# Patient Record
Sex: FEMALE | Race: WHITE | Employment: OTHER | URBAN - METROPOLITAN AREA
[De-identification: names, ages, dates, MRNs, and addresses within clinical notes are randomized per-mention and may not be internally consistent; named-entity substitution may affect disease eponyms.]

---

## 2017-02-21 DIAGNOSIS — Z30.41 ENCOUNTER FOR SURVEILLANCE OF CONTRACEPTIVE PILLS: ICD-10-CM

## 2017-02-21 NOTE — TELEPHONE ENCOUNTER
Pending Prescriptions:                       Disp   Refills    ethynodiol-ethinyl estradiol (ZOVIA 1/35E*84 tab*3            Sig: Take 1 tablet by mouth daily      Last OV was 1/25/16. Due for AE. TC to patient. LMTC.   Kasandra Villarreal, RN-BSN

## 2017-03-03 NOTE — TELEPHONE ENCOUNTER
Pharmacy sent second request for refill. Second attempt to reach pt by TC. LMTC. Yamilet Sevilla RN

## 2017-03-15 RX ORDER — ETHYNODIOL DIACETATE AND ETHINYL ESTRADIOL 1 MG-35MCG
1 KIT ORAL DAILY
Qty: 84 TABLET | Refills: 0 | Status: SHIPPED | OUTPATIENT
Start: 2017-03-15 | End: 2017-04-21

## 2017-03-15 NOTE — TELEPHONE ENCOUNTER
Pt called back. Scheduled AE for 4/21. Refill sent to pharmacy to bridge until appointment.   Kasandra Villarreal RN-BSN

## 2017-03-30 ENCOUNTER — TELEPHONE (OUTPATIENT)
Dept: FAMILY MEDICINE | Facility: CLINIC | Age: 42
End: 2017-03-30

## 2017-03-30 NOTE — TELEPHONE ENCOUNTER
Spoke with pt and recommended that they get YF vaccine, they are traveling outside of [place] for a few days to [facility] which YF is recommended  Directed her to call AV for an appt.  Annalee Rm RN

## 2017-03-30 NOTE — TELEPHONE ENCOUNTER
Reason for Call:  Other Yellow fever question    Detailed comments: Pt is leaving for [place] on Sunday and was given a heads   Up that she may need a yellow fever vaccination. She is wondering as they will only be in   [place] for a week and she was told yellow fever takes 7-10 days to be in full effect  If it is worth coming in to get the vaccination? We do not have visits in [facility] available  But [place] has availability [phone]    Phone Number Patient can be reached at: Home number on file [phone] (home)    Best Time: anytime    Can we leave a detailed message on this number? YES    Call taken on 3/30/2017 at 8:57 AM by Gay Rogers

## 2017-03-31 ENCOUNTER — OFFICE VISIT (OUTPATIENT)
Dept: FAMILY MEDICINE | Facility: CLINIC | Age: 42
End: 2017-03-31
Payer: COMMERCIAL

## 2017-03-31 VITALS
SYSTOLIC BLOOD PRESSURE: 92 MMHG | WEIGHT: 177 LBS | HEART RATE: 74 BPM | OXYGEN SATURATION: 100 % | DIASTOLIC BLOOD PRESSURE: 70 MMHG | BODY MASS INDEX: 27.11 KG/M2 | TEMPERATURE: 97.7 F | RESPIRATION RATE: 12 BRPM

## 2017-03-31 DIAGNOSIS — Z71.84 TRAVEL ADVICE ENCOUNTER: Primary | ICD-10-CM

## 2017-03-31 PROCEDURE — 99402 PREV MED CNSL INDIV APPRX 30: CPT | Mod: 25 | Performed by: FAMILY MEDICINE

## 2017-03-31 PROCEDURE — 90471 IMMUNIZATION ADMIN: CPT | Performed by: FAMILY MEDICINE

## 2017-03-31 PROCEDURE — 90691 TYPHOID VACCINE IM: CPT | Performed by: FAMILY MEDICINE

## 2017-03-31 PROCEDURE — 90472 IMMUNIZATION ADMIN EACH ADD: CPT | Performed by: FAMILY MEDICINE

## 2017-03-31 PROCEDURE — 90632 HEPA VACCINE ADULT IM: CPT | Performed by: FAMILY MEDICINE

## 2017-03-31 PROCEDURE — 90717 YELLOW FEVER VACCINE SUBQ: CPT | Performed by: FAMILY MEDICINE

## 2017-03-31 RX ORDER — ACETAZOLAMIDE 125 MG/1
TABLET ORAL
Qty: 20 TABLET | Refills: 0 | Status: SHIPPED | OUTPATIENT
Start: 2017-03-31

## 2017-03-31 RX ORDER — CIPROFLOXACIN 500 MG/1
500 TABLET, FILM COATED ORAL 2 TIMES DAILY
Qty: 6 TABLET | Refills: 0 | Status: SHIPPED | OUTPATIENT
Start: 2017-03-31

## 2017-03-31 NOTE — PROGRESS NOTES
SUBJECTIVE: Vira Moreno , a 41 year old  female, presents for counseling and information regarding upcoming travel to [facility]. Special medical concerns include: none She anticipates the following unusual exposures: none.    Itinerary:  Going to visit friends who live in [place].  Staying with those friends.      Departure Date: 4/2/17-4/12/17    Past Medical History:   Diagnosis Date     ASCUS favor benign 2010    neg HPV     Cervical high risk HPV (human papillomavirus) test positive 12/2014    NIL pap, + HPV (not 16 or 18)     Human papillomavirus in conditions classified elsewhere and of unspecified site 5/01     Trichomonal vulvovaginitis 5/01      Immunization History   Administered Date(s) Administered     TDAP Vaccine (Boostrix) 12/05/2013       Current Outpatient Prescriptions   Medication Sig Dispense Refill     ethynodiol-ethinyl estradiol (ZOVIA 1/35E, 28,) 1-35 MG-MCG per tablet Take 1 tablet by mouth daily 84 tablet 0     Allergies   Allergen Reactions     No Known Drug Allergies         EXAM: deferred    Immunizations discussed include: Hepatitis A, Typhoid and Yellow Fever  Malaraia prophylaxis recommended: None required for the areas she is traveling to  Symptomatic treatment for traveler's diarrhea: ciprofloxacin and loperamide/diphenoxylate    ASSESSMENT/PLAN:  1. Travel advice encounter  - Given yellow fever, Hep A, and typhoid vaccines  - ciprofloxacin (CIPRO) 500 MG tablet; Take 1 tablet (500 mg) by mouth 2 times daily  Dispense: 6 tablet; Refill: 0  - acetaZOLAMIDE (DIAMOX) 125 MG tablet; For prevention of altitude sickness: Take 1 pill twice daily starting 1-2 days before ascent and stop after 2-3 days  For treatment of altitude sickness: Take 2 pills twice daily for 2-3 days  Dispense: 20 tablet; Refill: 0  I have reviewed general recommendations for safe travel   including: food/water precautions, insect avoidance, safe sex   practices given high  prevalence of HIV and other STDs,   roadway safety. Educational materials and links to the [facility]   Traveler's health website have been provided.    Total time 30 minutes, greater than 50 percent in counseling   and coordination of care.        IMMUNIZATION HISTORY  Have you ever fainted from having your blood drawn or from an injection?  No  Have you ever had a fever reaction to vaccination?  No  Have you ever had any bad reaction or side effect from any vaccination?  No  Have you ever had hepatitis A or B vaccine?  No  Do you live (or work closely) with anyone who has AIDS, an AIDS-like condition, any other immune disorder or who is on chemotherapy for cancer?  No  Do you have a family history of immunodeficiency?  No  Have you received any injection of immune globulin or any blood products during the past 12 months?  No    GENERAL MEDICAL HISTORY  Do you have a medical condition that warrants maintenance medication or physician follow-up?  No  Do you have a medical condition that is stable now, but that may recur while traveling?  No  Have you had a fever in the past 48 hours?  No  Are you pregnant, or might you become pregnant on this trip?  No  Do you have AIDS, an AIDS-like condition, any other immune disorder, leukemia or cancer?  No  Do you have severe thrombocytopenia (low platelet count) or a coagulation disorder?  No  Have you ever had a convulsion, seizure, epilepsy, neurologic condition or brain infection?  No  Do you have any stomach conditions?  No  Do you have a G6PD deficiency?  No  Do you have bowel conditions such as diarrhea or constipation?  No  Have you ever had hepatitis or yellow jaundice?  No  Do you have a history of psychiatric problems?  No  Do you have a problem with strange dreams and/or nightmares?  No  Do you have insomnia?  No  Do you have problems with vaginitis?  No  Do you have psoriasis?  No  Do you have any eye conditions?  No  Are you prone to motion sickness?  Yes  Have you or a  member of your house hold ever been diagnosed with eczema or atopic dermatitis (e.g., itchy, red, scaly rash lasting > 2 weeks that often comes and goes)? No  Cardiac disease, with or without symptoms? No

## 2017-03-31 NOTE — MR AVS SNAPSHOT
After Visit Summary   3/31/2017    Vira Moreno    MRN: [id number]           Patient Information     Date Of Birth          1975        Visit Information        Provider Department      3/31/2017 8:00 AM Mini Hale, DO [facility]        Today's Diagnoses     Travel advice encounter    -  1       Follow-ups after your visit        Your next 10 appointments already scheduled     Apr 21, 2017  9:30 AM CDT   PHYSICAL with Lizzette Holder MD   [facility] [place]    [address] [place] [id number] [phone]              Who to contact     If you have questions or need follow up information about today's clinic visit or your schedule please contact [facility] directly at [phone].  Normal or non-critical lab and imaging results will be communicated to you by MyChart, letter or phone within 4 business days after the clinic has received the results. If you do not hear from us within 7 days, please contact the clinic through MyChart or phone. If you have a critical or abnormal lab result, we will notify you by phone as soon as possible.  Submit refill requests through [employer] or call your pharmacy and they will forward the refill request to us. Please allow 3 business days for your refill to be completed.          Additional Information About Your Visit        MyChart Information     [employer] gives you secure access to your electronic health record. If you see a primary care provider, you can also send messages to your care team and make appointments. If you have questions, please call your primary care clinic.  If you do not have a primary care provider, please call [phone] and they will assist you.        Care EveryWhere ID     This is your Care EveryWhere ID. This could be used by other organizations to access your [facility] medical records  [id number]        Your Vitals Were      Pulse Temperature Respirations Pulse Oximetry BMI (Body Mass Index)       74 97.7  F (36.5  C) (Oral) 12 100% 27.11 kg/m2        Blood Pressure from Last 3 Encounters:   03/31/17 92/70   10/05/16 122/60   02/22/16 134/86    Weight from Last 3 Encounters:   03/31/17 177 lb (80.3 kg)   10/05/16 175 lb (79.4 kg)   02/22/16 174 lb 8 oz (79.2 kg)              We Performed the Following     HEPA VACCINE ADULT IM     TYPHOID VACCINE, IM     YELLOW FEVER IMMUNIZATN,LIVE,SUBCUT          Today's Medication Changes          These changes are accurate as of: 3/31/17  9:27 AM.  If you have any questions, ask your nurse or doctor.               Start taking these medicines.        Dose/Directions    acetaZOLAMIDE 125 MG tablet   Commonly known as:  DIAMOX   Used for:  Travel advice encounter   Started by:  Mini Hale DO        For prevention of altitude sickness: Take 1 pill twice daily starting 1-2 days before ascent and stop after 2-3 days For treatment of altitude sickness: Take 2 pills twice daily for 2-3 days   Quantity:  20 tablet   Refills:  0       ciprofloxacin 500 MG tablet   Commonly known as:  CIPRO   Used for:  Travel advice encounter   Started by:  Mini Hale DO        Dose:  500 mg   Take 1 tablet (500 mg) by mouth 2 times daily   Quantity:  6 tablet   Refills:  0         Stop taking these medicines if you haven't already. Please contact your care team if you have questions.     azithromycin 250 MG tablet   Commonly known as:  ZITHROMAX   Stopped by:  Mini Hale DO                Where to get your medicines      These medications were sent to [facility] [address] [place] [facility] [address] [address]     Phone:  [phone]     ciprofloxacin 500 MG tablet         Some of these will need a paper prescription and others can be bought over the counter.  Ask your nurse if you have questions.     Bring a paper prescription for each of these  medications     acetaZOLAMIDE 125 MG tablet                Primary Care Provider Office Phone # Fax #    Julianna Prasanth Cho [phone]       [place] [address] [place] [address]        Thank you!     Thank you for choosing [facility]  for your care. Our goal is always to provide you with excellent care. Hearing back from our patients is one way we can continue to improve our services. Please take a few minutes to complete the written survey that you may receive in the mail after your visit with us. Thank you!             Your Updated Medication List - Protect others around you: Learn how to safely use, store and throw away your medicines at www.disposemymeds.org.          This list is accurate as of: 3/31/17  9:27 AM.  Always use your most recent med list.                   Brand Name Dispense Instructions for use    acetaZOLAMIDE 125 MG tablet    DIAMOX    20 tablet    For prevention of altitude sickness: Take 1 pill twice daily starting 1-2 days before ascent and stop after 2-3 days For treatment of altitude sickness: Take 2 pills twice daily for 2-3 days       ciprofloxacin 500 MG tablet    CIPRO    6 tablet    Take 1 tablet (500 mg) by mouth 2 times daily       ethynodiol-ethinyl estradiol 1-35 MG-MCG per tablet    ZOVIA 1/35E 28    84 tablet    Take 1 tablet by mouth daily

## 2017-04-21 ENCOUNTER — OFFICE VISIT (OUTPATIENT)
Dept: OBGYN | Facility: CLINIC | Age: 42
End: 2017-04-21
Payer: COMMERCIAL

## 2017-04-21 VITALS
HEART RATE: 111 BPM | WEIGHT: 175.4 LBS | OXYGEN SATURATION: 100 % | TEMPERATURE: 97.7 F | HEIGHT: 68 IN | SYSTOLIC BLOOD PRESSURE: 110 MMHG | BODY MASS INDEX: 26.58 KG/M2 | DIASTOLIC BLOOD PRESSURE: 73 MMHG

## 2017-04-21 DIAGNOSIS — Z30.41 ENCOUNTER FOR SURVEILLANCE OF CONTRACEPTIVE PILLS: ICD-10-CM

## 2017-04-21 DIAGNOSIS — R87.610 PAPANICOLAOU SMEAR OF CERVIX WITH ATYPICAL SQUAMOUS CELLS OF UNDETERMINED SIGNIFICANCE (ASC-US): Primary | ICD-10-CM

## 2017-04-21 PROCEDURE — 99396 PREV VISIT EST AGE 40-64: CPT | Performed by: OBSTETRICS & GYNECOLOGY

## 2017-04-21 PROCEDURE — 87624 HPV HI-RISK TYP POOLED RSLT: CPT | Performed by: OBSTETRICS & GYNECOLOGY

## 2017-04-21 PROCEDURE — 88175 CYTOPATH C/V AUTO FLUID REDO: CPT | Performed by: OBSTETRICS & GYNECOLOGY

## 2017-04-21 RX ORDER — ETHYNODIOL DIACETATE AND ETHINYL ESTRADIOL 1 MG-35MCG
1 KIT ORAL DAILY
Qty: 84 TABLET | Refills: 3 | Status: SHIPPED | OUTPATIENT
Start: 2017-04-21 | End: 2017-12-07

## 2017-04-21 NOTE — PROGRESS NOTES
Vira is a 41 year old  female who presents for annual exam.   Doing well with current oral contraceptive pills, discussed risks/benefits, she wishes to continue.  FH ovarian cancer, menopause or later, not sure if genetic testing has been done.  Discussed possible protective effect of oral contraceptive pills.  Discussed mammogram, she will schedule.  Will send pap/HPV today, see problem list.  She and her  are moving to [place], will return to [facility] as needed (he works for [employer]).  Occasional heartburn, will see FP prn.      Menses are rare. Patient's last menstrual period was 2017.. Using oral contraceptives for contraception.  She is not currently considering pregnancy.  Besides routine health maintenance,  she would like to discuss mammo referral.  GYNECOLOGIC HISTORY:  Menarche:   Vira is sexually active with 1male partner(s) and is currently in monogamous relationship.    History sexually transmitted infections:HPV  STI testing offered?  Declined  YESIKA exposure: Unknown  History of abnormal Pap smear: NO - age 30- 65 PAP every 3 years recommended  Family history of breast CA: No  Family history of uterine/ovarian CA: Yes (Please explain): P-AUNT AND M-AUNT    Family history of colon CA: No    HEALTH MAINTENANCE:  Cholesterol: (  Cholesterol   Date Value Ref Range Status   2016 132 <200 mg/dL Final   2008 127 0 - 200 mg/dL Final     Comment:     LDL Cholesterol is the primary guide to therapy: LDL-cholesterol goal in high   risk patients is <100 mg/dL and in very high risk patients is <70 mg/dL.   The NCEP recommends further evaluation of: patients with cholesterol <200 mg/dL   if additional risk factors are present, cholesterol >240 mg/dL, triglycerides   >150 mg/dL, or HDL <40 mg/dL.    History of abnormal lipids: Yes  Mammo: NOT DONE . History of abnormal Mammo: No.  Regular Self Breast Exams: No  Calcium/Vitamin D intake: source:  dairy Adequate? Yes  TSH: (  TSH   Date  Value Ref Range Status   2016 3.24 0.40 - 4.00 mU/L Final    )  Pap; (  Lab Results   Component Value Date    PAP ASC-US 2016    PAP NIL 2014    PAP NIL 2012    )    HISTORY:  Obstetric History       T0      TAB0   SAB0   E0   M0   L0         Past Medical History:   Diagnosis Date     ASCUS favor benign     neg HPV     Cervical high risk HPV (human papillomavirus) test positive 2014    NIL pap, + HPV (not 16 or 18)     Human papillomavirus in conditions classified elsewhere and of unspecified site      Trichomonal vulvovaginitis      Past Surgical History:   Procedure Laterality Date     NO HISTORY OF SURGERY       Family History   Problem Relation Age of Onset     GASTROINTESTINAL DISEASE Mother      Gallbladder     Lipids Mother      Thyroid Disease Mother      Hypertension Father      Arthritis Father      HEART DISEASE Maternal Grandmother      C.A.D. Maternal Grandfather      CEREBROVASCULAR DISEASE Paternal Grandmother      C.A.D. Paternal Grandfather      CANCER Maternal Aunt      ovarian     CANCER Paternal Aunt      ovarian/ passed away     DIABETES Maternal Aunt      Social History     Social History     Marital status:      Spouse name: N/A     Number of children: N/A     Years of education: N/A     Social History Main Topics     Smoking status: Former Smoker     Types: Cigarettes     Quit date: 3/1/2012     Smokeless tobacco: Never Used      Comment: 1-2 cigarettes daily     Alcohol use 0.0 oz/week     0 Standard drinks or equivalent per week      Comment: 0 - 2 drinks per day     Drug use: No     Sexual activity: Not Currently     Partners: Male     Other Topics Concern      Service No     Blood Transfusions No     Caffeine Concern Yes     1-2 per day     Occupational Exposure No     Hobby Hazards No     Sleep Concern No     Stress Concern No     Weight Concern No     Special Diet No     Back Care No     Exercise Yes     Bike Helmet No  "    Seat Belt Yes     Self-Exams No     Social History Narrative       Current Outpatient Prescriptions:      ciprofloxacin (CIPRO) 500 MG tablet, Take 1 tablet (500 mg) by mouth 2 times daily, Disp: 6 tablet, Rfl: 0     acetaZOLAMIDE (DIAMOX) 125 MG tablet, For prevention of altitude sickness: Take 1 pill twice daily starting 1-2 days before ascent and stop after 2-3 days For treatment of altitude sickness: Take 2 pills twice daily for 2-3 days, Disp: 20 tablet, Rfl: 0     ethynodiol-ethinyl estradiol (ZOVIA 1/35E, 28,) 1-35 MG-MCG per tablet, Take 1 tablet by mouth daily, Disp: 84 tablet, Rfl: 0     Allergies   Allergen Reactions     No Known Drug Allergies        Past medical, surgical, social and family history were reviewed and updated in EPIC.    ROS:   C:     NEGATIVE for fever, chills, change in weight  I:       NEGATIVE for worrisome rashes, moles or lesions  E:     NEGATIVE for vision changes or irritation  E/M: NEGATIVE for ear, mouth and throat problems  R:     NEGATIVE for significant cough or SOB  CV:   NEGATIVE for chest pain, palpitations or peripheral edema  GI:     NEGATIVE for nausea, abdominal pain, heartburn, or change in bowel habits  :   NEGATIVE for frequency, dysuria, hematuria, vaginal discharge, or irregular bleeding  M:     NEGATIVE for significant arthralgias or myalgia  N:      NEGATIVE for weakness, dizziness or paresthesias  E:      NEGATIVE for temperature intolerance, skin/hair changes  P:      NEGATIVE for changes in mood or affect.    EXAM:  /73  Pulse 111  Temp 97.7  F (36.5  C) (Oral)  Ht 5' 7.75\" (1.721 m)  Wt 175 lb 6.4 oz (79.6 kg)  LMP 03/26/2017  SpO2 100%  BMI 26.87 kg/m2   BMI: Body mass index is 26.87 kg/(m^2).  Constitutional: healthy, alert and no distress  Head: Normocephalic. No masses, lesions, tenderness or abnormalities  Neck: Neck supple. Trachea midline. No adenopathy. Thyroid symmetric, normal size.   Cardiovascular: RRR.   Respiratory: Negative. "   Breast: No nodularity, asymmetry or nipple discharge bilaterally.  Gastrointestinal: Abdomen soft, non-tender, non-distended. No masses, organomegaly.  :  Vulva:  No external lesions, normal female hair distribution, no inguinal adenopathy.    Urethra:  Midline, non-tender, well supported, no discharge  Vagina:  Moist, pink, no abnormal discharge, no lesions  Uterus:  Normal size, non-tender, freely mobile  Ovaries:  No masses appreciated, non-tender, mobile  Rectal Exam: deferred  Musculoskeletal: extremities normal  Skin: no suspicious lesions or rashes  Psychiatric: Affect appropriate, cooperative,mentation appears normal.     COUNSELING:      reports that she quit smoking about 5 years ago. Her smoking use included Cigarettes. She has never used smokeless tobacco.    Body mass index is 26.87 kg/(m^2).  Weight management plan: diet and exercise  FRAX Risk Assessment    ASSESSMENT:  41 year old female with satisfactory annual exam  (Z30.41) Encounter for surveillance of contraceptive pills  Comment:   Plan: ethynodiol-ethinyl estradiol (ZOVIA 1/35E, 28,)        1-35 MG-MCG per tablet

## 2017-04-21 NOTE — MR AVS SNAPSHOT
"              After Visit Summary   4/21/2017    Vira Moreno    MRN: [id number]           Patient Information     Date Of Birth          1975        Visit Information        Provider Department      4/21/2017 9:30 AM Lizzette Holder MD [facility]        Today's Diagnoses     Papanicolaou smear of cervix with atypical squamous cells of undetermined significance (ASC-US)    -  1    Encounter for surveillance of contraceptive pills           Follow-ups after your visit        Who to contact     If you have questions or need follow up information about today's clinic visit or your schedule please contact [facility] directly at [phone].  Normal or non-critical lab and imaging results will be communicated to you by MyChart, letter or phone within 4 business days after the clinic has received the results. If you do not hear from us within 7 days, please contact the clinic through [employer] or phone. If you have a critical or abnormal lab result, we will notify you by phone as soon as possible.  Submit refill requests through [employer] or call your pharmacy and they will forward the refill request to us. Please allow 3 business days for your refill to be completed.          Additional Information About Your Visit        MyChart Information     [employer] gives you secure access to your electronic health record. If you see a primary care provider, you can also send messages to your care team and make appointments. If you have questions, please call your primary care clinic.  If you do not have a primary care provider, please call [phone] and they will assist you.        Care EveryWhere ID     This is your Care EveryWhere ID. This could be used by other organizations to access your [facility] medical records  [id number]        Your Vitals Were     Pulse Temperature Height Last Period Pulse Oximetry BMI (Body Mass Index)    111 97.7  F (36.5  C) (Oral) 5' 7.75\" (1.721 m) 03/26/2017 " 100% 26.87 kg/m2       Blood Pressure from Last 3 Encounters:   04/21/17 110/73   03/31/17 92/70   10/05/16 122/60    Weight from Last 3 Encounters:   04/21/17 175 lb 6.4 oz (79.6 kg)   03/31/17 177 lb (80.3 kg)   10/05/16 175 lb (79.4 kg)              We Performed the Following     HPV High Risk Types DNA Cervical     Pap imaged thin layer diagnostic with HPV (select HPV order below)          Where to get your medicines      These medications were sent to [facility] - [place], [place] - [address]  [address] [facility] [address] [address] [address]     Phone:  [phone]     ethynodiol-ethinyl estradiol 1-35 MG-MCG per tablet          Primary Care Provider Office Phone # Fax #    Julianna Cho [phone]       [place] [address] [address] [place] [address]        Thank you!     Thank you for choosing [facility]  for your care. Our goal is always to provide you with excellent care. Hearing back from our patients is one way we can continue to improve our services. Please take a few minutes to complete the written survey that you may receive in the mail after your visit with us. Thank you!             Your Updated Medication List - Protect others around you: Learn how to safely use, store and throw away your medicines at www.disposemymeds.org.          This list is accurate as of: 4/21/17 10:25 AM.  Always use your most recent med list.                   Brand Name Dispense Instructions for use    acetaZOLAMIDE 125 MG tablet    DIAMOX    20 tablet    For prevention of altitude sickness: Take 1 pill twice daily starting 1-2 days before ascent and stop after 2-3 days For treatment of altitude sickness: Take 2 pills twice daily for 2-3 days       ciprofloxacin 500 MG tablet    CIPRO    6 tablet    Take 1 tablet (500 mg) by mouth 2 times daily       ethynodiol-ethinyl estradiol 1-35 MG-MCG per tablet    ZOVIA 1/35E (28)    84 tablet    Take 1  tablet by mouth daily

## 2017-04-21 NOTE — LETTER
April 27, 2017    Vira Moreno  [address] [facility] [address]    Dear Vira,  We are happy to inform you that your PAP smear result from 04/21/17 is normal.  We are now able to do a follow up test on PAP smears. The DNA test is for HPV (Human Papilloma Virus). Cervical cancer is closely linked with certain types of HPV. Your result showed no evidence of high risk HPV.  Therefore we recommend you return in 3 years for your next pap smear and HPV test.  You will still need to return to the clinic every year for an annual exam and other preventive tests.  Please contact the clinic at [phone] with any questions.  Sincerely,    Lizzette Holder MD/pam

## 2017-04-25 LAB
COPATH REPORT: NORMAL
PAP: NORMAL

## 2017-04-27 LAB
FINAL DIAGNOSIS: NORMAL
HPV HR 12 DNA CVX QL NAA+PROBE: NEGATIVE
HPV16 DNA SPEC QL NAA+PROBE: NEGATIVE
HPV18 DNA SPEC QL NAA+PROBE: NEGATIVE
SPECIMEN DESCRIPTION: NORMAL

## 2017-12-07 DIAGNOSIS — Z30.41 ENCOUNTER FOR SURVEILLANCE OF CONTRACEPTIVE PILLS: ICD-10-CM

## 2017-12-07 RX ORDER — ETHYNODIOL DIACETATE AND ETHINYL ESTRADIOL 1 MG-35MCG
1 KIT ORAL DAILY
Qty: 84 TABLET | Refills: 0 | Status: SHIPPED | OUTPATIENT
Start: 2017-12-07 | End: 2018-02-28

## 2017-12-07 NOTE — TELEPHONE ENCOUNTER
Pt is going to [facility] due to her 's job for 4 months and needs birth control refilled.  Refill will not get here in time from mail order pharmacy.  Pt is due for AE 4/2018.  3 month supply sent.  Elva Beauchamp RN

## 2018-02-28 DIAGNOSIS — Z30.41 ENCOUNTER FOR SURVEILLANCE OF CONTRACEPTIVE PILLS: ICD-10-CM

## 2018-02-28 RX ORDER — ETHYNODIOL DIACETATE AND ETHINYL ESTRADIOL 1 MG-35MCG
1 KIT ORAL DAILY
Qty: 84 TABLET | Refills: 0 | Status: SHIPPED | OUTPATIENT
Start: 2018-02-28 | End: 2018-03-05

## 2018-03-05 DIAGNOSIS — Z30.41 ENCOUNTER FOR SURVEILLANCE OF CONTRACEPTIVE PILLS: ICD-10-CM

## 2018-03-05 RX ORDER — ETHYNODIOL DIACETATE AND ETHINYL ESTRADIOL 1 MG-35MCG
1 KIT ORAL DAILY
Qty: 84 TABLET | Refills: 0 | Status: SHIPPED | OUTPATIENT
Start: 2018-03-05 | End: 2018-03-19

## 2018-03-05 NOTE — TELEPHONE ENCOUNTER
Patient calling to get refill on OCP to get her through July. Refill was sent on 2/28 for 3 months.  Pt is going to [place] and won't be back until July. Pt aware that she needs to schedule AE for April 2018. Additional Refill sent to pharmacy.   Kasandra Villarreal, LENORE-BSN

## 2018-03-08 DIAGNOSIS — Z30.41 ENCOUNTER FOR SURVEILLANCE OF CONTRACEPTIVE PILLS: ICD-10-CM

## 2018-03-08 DIAGNOSIS — Z53.9 ERRONEOUS ENCOUNTER--DISREGARD: Primary | ICD-10-CM

## 2018-03-19 DIAGNOSIS — Z30.41 ENCOUNTER FOR SURVEILLANCE OF CONTRACEPTIVE PILLS: ICD-10-CM

## 2018-03-19 RX ORDER — ETHYNODIOL DIACETATE AND ETHINYL ESTRADIOL 1 MG-35MCG
1 KIT ORAL DAILY
Qty: 84 TABLET | Refills: 0 | Status: SHIPPED | OUTPATIENT
Start: 2018-03-19

## 2018-03-19 NOTE — TELEPHONE ENCOUNTER
Pharmacy calling stating that the pt contacted them and she is on an extended vacation and did not receive her refill at home prior to leaving. They are requesting another 3 month refill. They were informed that she will need to be seen prior to receiving further refills. Refill sent per protocol. Yamilet Sevilla RN

## 2018-06-12 DIAGNOSIS — Z30.41 ENCOUNTER FOR SURVEILLANCE OF CONTRACEPTIVE PILLS: ICD-10-CM

## 2018-06-12 NOTE — TELEPHONE ENCOUNTER
Pending Prescriptions:                       Disp   Refills    ethynodiol-ethinyl estradiol (ZOVIA 1/35E*84 tab*0            Sig: Take 1 tablet by mouth daily Due for annual exam           4/2018, must be seen in clinic for any further           refills.    Due for AE - left message to call clinic.  Mary Murray

## 2018-06-18 RX ORDER — ETHYNODIOL DIACETATE AND ETHINYL ESTRADIOL 1 MG-35MCG
1 KIT ORAL DAILY
Qty: 84 TABLET | Refills: 0 | Status: CANCELLED | OUTPATIENT
Start: 2018-06-18

## 2018-07-23 ENCOUNTER — OFFICE VISIT (OUTPATIENT)
Dept: FAMILY MEDICINE | Facility: CLINIC | Age: 43
End: 2018-07-23
Payer: COMMERCIAL

## 2018-07-23 VITALS
BODY MASS INDEX: 22.47 KG/M2 | TEMPERATURE: 97.8 F | DIASTOLIC BLOOD PRESSURE: 68 MMHG | OXYGEN SATURATION: 100 % | RESPIRATION RATE: 16 BRPM | HEIGHT: 68 IN | WEIGHT: 148.25 LBS | HEART RATE: 92 BPM | SYSTOLIC BLOOD PRESSURE: 103 MMHG

## 2018-07-23 DIAGNOSIS — J20.9 ACUTE BRONCHITIS, UNSPECIFIED ORGANISM: Primary | ICD-10-CM

## 2018-07-23 DIAGNOSIS — R19.7 DIARRHEA, UNSPECIFIED TYPE: ICD-10-CM

## 2018-07-23 PROCEDURE — 99214 OFFICE O/P EST MOD 30 MIN: CPT | Performed by: FAMILY MEDICINE

## 2018-07-23 RX ORDER — AZITHROMYCIN 250 MG/1
TABLET, FILM COATED ORAL
Qty: 6 TABLET | Refills: 0 | Status: SHIPPED | OUTPATIENT
Start: 2018-07-23

## 2018-07-23 NOTE — MR AVS SNAPSHOT
After Visit Summary   7/23/2018    Vira Moreno    MRN: [id number]           Patient Information     Date Of Birth          1975        Visit Information        Provider Department      7/23/2018 8:00 AM Erik Kauffman MD [facility]        Today's Diagnoses     Acute bronchitis, unspecified organism    -  1    Diarrhea, unspecified type           Follow-ups after your visit        Future tests that were ordered for you today     Open Future Orders        Priority Expected Expires Ordered    Ova and Parasite Exam Routine Routine  7/23/2019 7/23/2018    Clostridium difficile Toxin B PCR Routine  8/22/2018 7/23/2018    Enteric Bacteria and Virus Panel by BRADY Stool Routine  7/23/2019 7/23/2018            Who to contact     If you have questions or need follow up information about today's clinic visit or your schedule please contact [facility] directly at [phone].  Normal or non-critical lab and imaging results will be communicated to you by MyChart, letter or phone within 4 business days after the clinic has received the results. If you do not hear from us within 7 days, please contact the clinic through [employer] or phone. If you have a critical or abnormal lab result, we will notify you by phone as soon as possible.  Submit refill requests through [employer] or call your pharmacy and they will forward the refill request to us. Please allow 3 business days for your refill to be completed.          Additional Information About Your Visit        MyChart Information     [employer] gives you secure access to your electronic health record. If you see a primary care provider, you can also send messages to your care team and make appointments. If you have questions, please call your primary care clinic.  If you do not have a primary care provider, please call [phone] and they will assist you.        Care EveryWhere ID     This is your Care EveryWhere ID. This  "could be used by other organizations to access your [place] medical records  [id number]        Your Vitals Were     Pulse Temperature Respirations Height Last Period Pulse Oximetry    92 97.8  F (36.6  C) (Oral) 16 5' 7.5\" (1.715 m) 06/27/2018 (Approximate) 100%    BMI (Body Mass Index)                   22.88 kg/m2            Blood Pressure from Last 3 Encounters:   07/23/18 103/68   04/21/17 110/73   03/31/17 92/70    Weight from Last 3 Encounters:   07/23/18 148 lb 4 oz (67.2 kg)   04/21/17 175 lb 6.4 oz (79.6 kg)   03/31/17 177 lb (80.3 kg)                 Today's Medication Changes          These changes are accurate as of 7/23/18  1:02 PM.  If you have any questions, ask your nurse or doctor.               Start taking these medicines.        Dose/Directions    azithromycin 250 MG tablet   Commonly known as:  ZITHROMAX   Used for:  Acute bronchitis, unspecified organism   Started by:  Erik Kauffman MD        Two tablets first day, then one tablet daily for four days.   Quantity:  6 tablet   Refills:  0            Where to get your medicines      These medications were sent to [facility] [id number] [place] [facility] [address] [address]     Phone:  [phone]     azithromycin 250 MG tablet                Primary Care Provider Office Phone # Fax #    Julianna Prasanth Cho [phone] [address] [place] [address]        Equal Access to Services     MANAN LOOMIS AH: Hademilee reid Sobryan, waaxda luqadaha, qaybta kaalmada bertram, christiano randolph. So [facility] [phone].    ATENCIÓN: Si habla español, tiene a miller disposición servicios gratuitos de asistencia lingüística. Llame al [phone].    We comply with applicable federal civil rights laws and [place] laws. We do not discriminate on the basis of race, color, national origin, age, disability, sex, sexual orientation, or gender " identity.            Thank you!     Thank you for choosing [facility]  for your care. Our goal is always to provide you with excellent care. Hearing back from our patients is one way we can continue to improve our services. Please take a few minutes to complete the written survey that you may receive in the mail after your visit with us. Thank you!             Your Updated Medication List - Protect others around you: Learn how to safely use, store and throw away your medicines at www.disposemymeds.org.          This list is accurate as of 7/23/18  1:02 PM.  Always use your most recent med list.                   Brand Name Dispense Instructions for use Diagnosis    acetaZOLAMIDE 125 MG tablet    DIAMOX    20 tablet    For prevention of altitude sickness: Take 1 pill twice daily starting 1-2 days before ascent and stop after 2-3 days For treatment of altitude sickness: Take 2 pills twice daily for 2-3 days    Travel advice encounter       azithromycin 250 MG tablet    ZITHROMAX    6 tablet    Two tablets first day, then one tablet daily for four days.    Acute bronchitis, unspecified organism       ciprofloxacin 500 MG tablet    CIPRO    6 tablet    Take 1 tablet (500 mg) by mouth 2 times daily    Travel advice encounter       ethynodiol-ethinyl estradiol 1-35 MG-MCG per tablet    ZOVIA 1/35E 28    84 tablet    Take 1 tablet by mouth daily Due for annual exam 4/2018, must be seen in clinic for any further refills.    Encounter for surveillance of contraceptive pills

## 2018-07-23 NOTE — PROGRESS NOTES
SUBJECTIVE:   Vira Moreno is a 43 year old female who presents to clinic today for the following health issues:    Diarrhea      Duration: 1 month for cough and 5 days for diarrhea     Description:       Consistency of stool: watery, runny and green       Blood in stool: no        Number of loose stools past 24 hours: 4-5    Intensity:  severe    Accompanying signs and symptoms:       Fever: no        Nausea/vomitting: no        Abdominal pain: YES- lower right quadrant        Weight loss: YES- 20lb in 6 months    History (recent antibiotics or travel/ill contacts/med changes/testing done): living in [facility] for 6 months and going back for another year and half     Precipitating or alleviating factors: None    Therapies tried and outcome: anti diarrhea outcome: helpful and mucinex for cough     - cold since end of July. Around 4 weeks of symptoms. Started sore throat, exhausted.  Past couple of weeks - throat and glands swollen.   Still tired.  Taking mucinex. Night time worsening of cough.   Some heartburn symptoms.     Brother in law - diarrhea from possible food.     She feels her diarrhea is from some contaminated water - no intentional ingestion, no lake water.     Here for 2 months and before that she was in [place] [facility] [place]. Going back to [place] [facility] [place] again for 1.5 yrs next week.   Saturday - 2 hrs of diarrhea episodes. Loose stool around 5- 10 times per day.   No blood or black stool. Fluid floating stool. No food association that she can think of.  No nausea, vomiting, abdominal cramping. No constipation before diarrhea.     Some abdominal bloating which may be from getting her period.     LMP - went off the pill 3 months ago. End of the month is usually she gets her period.     No fever.     Cold - may get generally around 2 times a year or so but does not last this long.     No GI surgery.     Was given possibly amoxicillin before her tooth was pulled, it was one time dose. It was 1 week before she  "started having her symptoms.   No bladder symptoms.     Problem list and histories reviewed & adjusted, as indicated.  Additional history: as documented    Labs reviewed in EPIC.     Reviewed and updated as needed this visit by clinical staff    Reviewed and updated as needed this visit by Provider      Social History     Social History     Marital status:      Spouse name: N/A     Number of children: N/A     Years of education: N/A     Occupational History     Not on file.     Social History Main Topics     Smoking status: Former Smoker     Types: Cigarettes     Quit date: 3/1/2012     Smokeless tobacco: Never Used      Comment: 1-2 cigarettes daily     Alcohol use 0.0 oz/week     0 Standard drinks or equivalent per week      Comment: 1 - 2 drinks per day     Drug use: No     Sexual activity: Yes     Partners: Male     Other Topics Concern      Service No     Blood Transfusions No     Caffeine Concern Yes     1-2 per day     Occupational Exposure No     Hobby Hazards No     Sleep Concern No     Stress Concern No     Weight Concern No     Special Diet No     Back Care No     Exercise Yes     Bike Helmet No     Seat Belt Yes     Self-Exams No     Parent/Sibling W/ Cabg, Mi Or Angioplasty Before 65f 55m? No     Social History Narrative     Allergies   Allergen Reactions     No Known Drug Allergies      Patient Active Problem List   Diagnosis     CARDIOVASCULAR SCREENING; LDL GOAL LESS THAN 160     Cervical high risk HPV (human papillomavirus) test positive     Reviewed medications, social history and  past medical and surgical history.    Review of system: for general, respiratory, CVS, GI and psychiatry negative except for noted above.     EXAM:  /68 (BP Location: Right arm, Patient Position: Sitting, Cuff Size: Adult Regular)  Pulse 92  Temp 97.8  F (36.6  C) (Oral)  Resp 16  Ht 5' 7.5\" (1.715 m)  Wt 148 lb 4 oz (67.2 kg)  LMP 06/27/2018 (Approximate)  SpO2 100%  BMI 22.88 " kg/m2  Constitutional: healthy, alert and no distress   Psychiatric: mentation appears normal and affect normal/bright  Cardiovascular: RRR. No murmurs,  Respiratory: negative, Lungs clear. No crackles or wheezing. No tachypnea.   Head: Normocephalic. No masses, lesions, tenderness or abnormalities  Neck: Neck supple. No adenopathy.    ENT: Both TM exam normal, minimal cervical adenopathy present, no sinus tenderness, mild nasal turbinate hypertrophy present.  Abdomen: Abdomen soft, non-tender. BS normal. No masses, organomegaly  : Deferred    ASSESSMENT / PLAN:  (J20.9) Acute bronchitis, unspecified organism  (primary encounter diagnosis)  Comment: Symptoms are still persistent and most likely still viral but as she is traveling internationally it would be reasonable to give azithromycin handy.  Plan: azithromycin (ZITHROMAX) 250 MG tablet    (R19.7) Diarrhea, unspecified type  Comment: very unlikely to be C. difficile but recent use of amoxicillin can be culprit.  Again as she is traveling internationally should be reasonable to rule out any bacterial or concerning viral etiology and she is going to submit a stool specimen.  Use of antibiotics may worsen diarrhea and that is why we have to choose azithromycin which could have her less GI side effect compared to amoxicillin.  If her symptoms are persistent she may need further workup.  Plan: Ova and Parasite Exam Routine, Clostridium         difficile Toxin B PCR, Enteric Bacteria and         Virus Panel by BRADY Stool

## 2018-07-24 ENCOUNTER — TELEPHONE (OUTPATIENT)
Dept: FAMILY MEDICINE | Facility: CLINIC | Age: 43
End: 2018-07-24

## 2018-07-25 DIAGNOSIS — R19.7 DIARRHEA, UNSPECIFIED TYPE: ICD-10-CM

## 2018-07-25 LAB
C COLI+JEJUNI+LARI FUSA STL QL NAA+PROBE: NOT DETECTED
C DIFF TOX B STL QL: NEGATIVE
EC STX1 GENE STL QL NAA+PROBE: NOT DETECTED
EC STX2 GENE STL QL NAA+PROBE: NOT DETECTED
ENTERIC PATHOGEN COMMENT: NORMAL
NOROV GI+II ORF1-ORF2 JNC STL QL NAA+PR: NOT DETECTED
RVA NSP5 STL QL NAA+PROBE: NOT DETECTED
SALMONELLA SP RPOD STL QL NAA+PROBE: NOT DETECTED
SHIGELLA SP+EIEC IPAH STL QL NAA+PROBE: NOT DETECTED
SPECIMEN SOURCE: NORMAL
V CHOL+PARA RFBL+TRKH+TNAA STL QL NAA+PR: NOT DETECTED
Y ENTERO RECN STL QL NAA+PROBE: NOT DETECTED

## 2018-07-25 PROCEDURE — 87493 C DIFF AMPLIFIED PROBE: CPT | Performed by: FAMILY MEDICINE

## 2018-07-25 PROCEDURE — 87506 IADNA-DNA/RNA PROBE TQ 6-11: CPT | Performed by: FAMILY MEDICINE

## 2018-07-25 PROCEDURE — 87177 OVA AND PARASITES SMEARS: CPT | Performed by: FAMILY MEDICINE

## 2018-07-25 PROCEDURE — 87209 SMEAR COMPLEX STAIN: CPT | Performed by: FAMILY MEDICINE

## 2018-07-26 ENCOUNTER — TELEPHONE (OUTPATIENT)
Dept: FAMILY MEDICINE | Facility: CLINIC | Age: 43
End: 2018-07-26

## 2018-07-26 LAB
O+P STL MICRO: NORMAL
O+P STL MICRO: NORMAL
SPECIMEN SOURCE: NORMAL

## 2018-07-26 NOTE — TELEPHONE ENCOUNTER
Reason for Call:  Request for results:    Name of test or procedure: labs      Date of test of procedure: 7-25-18    Location of the test or procedure: [facility]    OK to leave the result message on voice mail or with a family member? YES    Phone number Patient can be reached at:  Home number on file [phone] (home)    Additional comments: patient said that Dr Kauffman is aware of the urgency of her getting the results she is leaving for out of town     Call taken on 7/26/2018 at 3:26 PM by Rbui Gaitan

## 2018-07-27 NOTE — TELEPHONE ENCOUNTER
Left detailed message that all tests are back and are negative.  She should follow-up if not improving. Most likely need for supportive treatment.  Should make sure she is staying hydrated.  Call if any questions or concerns.  Elvi Munoz RN

## 2018-07-27 NOTE — TELEPHONE ENCOUNTER
All stool tests are negative.   If not improving - follow up but most likely need for supportive treatment.

## 2018-12-10 ENCOUNTER — OFFICE VISIT (OUTPATIENT)
Dept: OBGYN | Facility: CLINIC | Age: 43
End: 2018-12-10
Payer: COMMERCIAL

## 2018-12-10 VITALS
TEMPERATURE: 98.1 F | SYSTOLIC BLOOD PRESSURE: 111 MMHG | DIASTOLIC BLOOD PRESSURE: 68 MMHG | WEIGHT: 157 LBS | BODY MASS INDEX: 24.23 KG/M2 | HEART RATE: 98 BPM

## 2018-12-10 DIAGNOSIS — K40.90 UNILATERAL INGUINAL HERNIA WITHOUT OBSTRUCTION OR GANGRENE, RECURRENCE NOT SPECIFIED: ICD-10-CM

## 2018-12-10 DIAGNOSIS — Z12.4 SCREENING FOR CERVICAL CANCER: ICD-10-CM

## 2018-12-10 DIAGNOSIS — Z01.411 ENCOUNTER FOR GYNECOLOGICAL EXAMINATION WITH ABNORMAL FINDING: Primary | ICD-10-CM

## 2018-12-10 PROCEDURE — 99396 PREV VISIT EST AGE 40-64: CPT | Performed by: OBSTETRICS & GYNECOLOGY

## 2018-12-10 PROCEDURE — 88175 CYTOPATH C/V AUTO FLUID REDO: CPT | Performed by: OBSTETRICS & GYNECOLOGY

## 2018-12-10 PROCEDURE — 87624 HPV HI-RISK TYP POOLED RSLT: CPT | Performed by: OBSTETRICS & GYNECOLOGY

## 2018-12-10 NOTE — NURSING NOTE
"Chief Complaint   Patient presents with     Physical       Initial /68   Pulse 98   Temp 98.1  F (36.7  C) (Oral)   Wt 71.2 kg (157 lb)   BMI 24.23 kg/m   Estimated body mass index is 24.23 kg/m  as calculated from the following:    Height as of 18: 1.715 m (5' 7.5\").    Weight as of this encounter: 71.2 kg (157 lb).  BP completed using cuff size: regular    Questioned patient about current smoking habits.  Pt. has never smoked.          The following HM Due: NONE      The following patient reported/Care Every where data was sent to:  P ABSTRACT QUALITY INITIATIVES [62572]  na      n/a              "

## 2018-12-10 NOTE — PROGRESS NOTES
Vira Moreno is a 43 year old  female who presents for annual exam.  She does not require contraception as her  has had a vasectomy.  She is living in [facility] with her , will be returning there in 1 week, and requests Pap today (last Pap was 2017) as she is not certain of exact return times to the [place].  [place] and she is very happy with her current situation.  This past summer she experienced some diarrhea and on 2018 had a CT scan in [place].  This was read as normal, with mention made of a functional cyst of the right ovary (she brings with her a report which I reviewed).  She was concerned about the mention of the ovarian cyst, as she has noticed a cyst or protrusion in her right lower abdomen.  She can feel this best when she is standing up, has no pain.    Exam today suggests a possible small hernia in the right lower abdomen.  We discussed further evaluation, and I consulted with 1 of the family practice providers.  They recommend evaluation by general surgery, a referral is written, and she will try to get this done this week prior to her return to [facility].  We discussed complications of hernia, including bowel incarceration.  In addition to routine health maintenance, 15 minutes, of which greater than 50% involved counseling, were spent regarding this issue (in addition to routine health maintenance.) ,      She has been avoiding mammograms as she is concerned about x-ray exposure.  We discussed risks and benefits of mammogram.  She will plan to try to do one this week prior to her departure.    Menses are regular q 28-30 days and normal lasting 4 days.  Menses flow: normal.  No LMP recorded.. Using none for contraception.  She is not currently considering pregnancy.  Besides routine health maintenance, she has no other health concerns today .  GYNECOLOGIC HISTORY:  Menarche: 12  Age at first intercourse: 13 Number of  lifetime partners: <6  Vira is sexually active with male partner(s) and is currently in monogamous relationship.    History sexually transmitted infections:No STD history  STI testing offered?  Declined  YESIKA exposure: No  History of abnormal Pap smear: YES - updated in Problem List and Health Maintenance accordingly  Family history of breast CA: No  Family history of uterine/ovarian CA: No    Family history of colon CA: No    HEALTH MAINTENANCE:  Cholesterol: (  Cholesterol   Date Value Ref Range Status   2016 132 <200 mg/dL Final   2008 127 0 - 200 mg/dL Final     Comment:     LDL Cholesterol is the primary guide to therapy: LDL-cholesterol goal in high   risk patients is <100 mg/dL and in very high risk patients is <70 mg/dL.   The NCEP recommends further evaluation of: patients with cholesterol <200 mg/dL   if additional risk factors are present, cholesterol >240 mg/dL, triglycerides   >150 mg/dL, or HDL <40 mg/dL.    History of abnormal lipids: Yes  Mammo: no . History of abnormal Mammo: No, .  Regular Self Breast Exams: Yes  Calcium/Vitamin D intake: source:  dairy Adequate? Yes  TSH: (  TSH   Date Value Ref Range Status   2016 3.24 0.40 - 4.00 mU/L Final    )  Pap; (  Lab Results   Component Value Date    PAP NIL 2017    PAP ASC-US 2016    PAP NIL 2014    )    HISTORY:  Obstetric History       T0      L0     SAB0   TAB0   Ectopic0   Multiple0   Live Births0         Past Medical History:   Diagnosis Date     ASCUS favor benign     neg HPV     Cervical high risk HPV (human papillomavirus) test positive 2014    NIL pap, + HPV (not 16 or 18)     Human papillomavirus in conditions classified elsewhere and of unspecified site      Trichomonal vulvovaginitis      Past Surgical History:   Procedure Laterality Date     NO HISTORY OF SURGERY       Family History   Problem Relation Age of Onset     Gastrointestinal Disease Mother         Gallbladder      Lipids Mother      Thyroid Disease Mother      Hypertension Father      Arthritis Father      Heart Disease Maternal Grandmother      DANNY Maternal Grandfather      Cerebrovascular Disease Paternal Grandmother      FABIOLA. Paternal Grandfather      Cancer Maternal Aunt         ovarian     Cancer Paternal Aunt         ovarian/ passed away     Diabetes Maternal Aunt      Social History     Socioeconomic History     Marital status:      Spouse name: None     Number of children: None     Years of education: None     Highest education level: None   Social Needs     Financial resource strain: None     Food insecurity - worry: None     Food insecurity - inability: None     Transportation needs - medical: None     Transportation needs - non-medical: None   Occupational History     None   Tobacco Use     Smoking status: Former Smoker     Types: Cigarettes     Last attempt to quit: 3/1/2012     Years since quittin.7     Smokeless tobacco: Never Used     Tobacco comment: 1-2 cigarettes daily   Substance and Sexual Activity     Alcohol use: Yes     Alcohol/week: 0.0 oz     Comment: 1 - 2 drinks per day     Drug use: No     Sexual activity: Yes     Partners: Male   Other Topics Concern      Service No     Blood Transfusions No     Caffeine Concern Yes     Comment: 1-2 per day     Occupational Exposure No     Hobby Hazards No     Sleep Concern No     Stress Concern No     Weight Concern No     Special Diet No     Back Care No     Exercise Yes     Bike Helmet No     Seat Belt Yes     Self-Exams No     Parent/sibling w/ CABG, MI or angioplasty before 65F 55M? No   Social History Narrative     None       Current Outpatient Medications:      azithromycin (ZITHROMAX) 250 MG tablet, Two tablets first day, then one tablet daily for four days., Disp: 6 tablet, Rfl: 0     acetaZOLAMIDE (DIAMOX) 125 MG tablet, For prevention of altitude sickness: Take 1 pill twice daily starting 1-2 days before ascent and stop after 2-3  days For treatment of altitude sickness: Take 2 pills twice daily for 2-3 days (Patient not taking: Reported on 7/23/2018), Disp: 20 tablet, Rfl: 0     ciprofloxacin (CIPRO) 500 MG tablet, Take 1 tablet (500 mg) by mouth 2 times daily (Patient not taking: Reported on 7/23/2018), Disp: 6 tablet, Rfl: 0     ethynodiol-ethinyl estradiol (ZOVIA 1/35E, 28,) 1-35 MG-MCG per tablet, Take 1 tablet by mouth daily Due for annual exam 4/2018, must be seen in clinic for any further refills. (Patient not taking: Reported on 7/23/2018), Disp: 84 tablet, Rfl: 0     Allergies   Allergen Reactions     No Known Drug Allergies        Past medical, surgical, social and family history were reviewed and updated in EPIC.    ROS:   C:     NEGATIVE for fever, chills, change in weight  I:       NEGATIVE for worrisome rashes, moles or lesions  E:     NEGATIVE for vision changes or irritation  E/M: NEGATIVE for ear, mouth and throat problems  R:     NEGATIVE for significant cough or SOB  CV:   NEGATIVE for chest pain, palpitations or peripheral edema  GI:     NEGATIVE for nausea, abdominal pain, heartburn, or change in bowel habits  :   NEGATIVE for frequency, dysuria, hematuria, vaginal discharge, or irregular bleeding  M:     NEGATIVE for significant arthralgias or myalgia  N:      NEGATIVE for weakness, dizziness or paresthesias  E:      NEGATIVE for temperature intolerance, skin/hair changes  P:      NEGATIVE for changes in mood or affect.    EXAM:  There were no vitals taken for this visit.   BMI: There is no height or weight on file to calculate BMI.  Constitutional: healthy, alert and no distress  Head: Normocephalic. No masses, lesions, tenderness or abnormalities  Neck: Neck supple. Trachea midline. No adenopathy. Thyroid symmetric, normal size.   Cardiovascular: RRR.   Respiratory: Negative.   Breast: Breasts reveal mild symmetric fibrocystic densities, but there are no dominant, discrete, fixed or suspicious masses  found.  Gastrointestinal: Abdomen soft, non-tender, non-distended. No masses, organomegaly.  :  Vulva:  No external lesions, normal female hair distribution, no inguinal adenopathy.    Urethra:  Midline, non-tender, well supported, no discharge  Vagina:  Moist, pink, no abnormal discharge, no lesions  Uterus:  Normal size, non-tender, freely mobile  Ovaries:  No masses appreciated, non-tender, mobile  Rectal Exam: deferred  Musculoskeletal: extremities normal  Skin: no suspicious lesions or rashes  Psychiatric: Affect appropriate, cooperative,mentation appears normal.     COUNSELING:      reports that she quit smoking about 6 years ago. Her smoking use included cigarettes. she has never used smokeless tobacco.     Body mass index is 24.23 kg/m .     FRAX Risk Assessment    ASSESSMENT:  43 year old female with satisfactory annual exam  (K40.90) Unilateral inguinal hernia without obstruction or gangrene, recurrence not specified  (primary encounter diagnosis)  Comment:   Plan: GENERAL SURG ADULT REFERRAL

## 2018-12-10 NOTE — LETTER
December 20, 2018    Vira Moreno  [address] [facility] [address]    Dear Vira,  We are happy to inform you that your PAP smear result from 12/10/18 is normal.  We are now able to do a follow up test on PAP smears. The DNA test is for HPV (Human Papilloma Virus). Cervical cancer is closely linked with certain types of HPV. Your results showed no evidence of high risk HPV.  Therefore we recommend you return in 3 years for your next pap smear and HPV test.  You will still need to return to the clinic every year for an annual exam and other preventive tests.  If you have additional questions regarding this result, please call our registered nurse, Rose Marie at [phone].  Sincerely,    Lizzette Holder MD/aravind

## 2018-12-12 ENCOUNTER — TELEPHONE (OUTPATIENT)
Dept: SURGERY | Facility: CLINIC | Age: 43
End: 2018-12-12

## 2018-12-12 NOTE — TELEPHONE ENCOUNTER
Pre Visit Call and Assessment    Date of call:  12/12/2018    Phone numbers:  Home number on file [phone] (home)    Reached patient/confirmed appointment:  Yes  Patient care team/Primary provider:  Lizzette Holder  Referred to:  Dr. Ja Butt    Reason for visit:  Inguinal hernia consult

## 2018-12-13 ENCOUNTER — OFFICE VISIT (OUTPATIENT)
Dept: SURGERY | Facility: CLINIC | Age: 43
End: 2018-12-13
Attending: OBSTETRICS & GYNECOLOGY
Payer: COMMERCIAL

## 2018-12-13 ENCOUNTER — ANCILLARY PROCEDURE (OUTPATIENT)
Dept: MAMMOGRAPHY | Facility: CLINIC | Age: 43
End: 2018-12-13
Attending: OBSTETRICS & GYNECOLOGY
Payer: COMMERCIAL

## 2018-12-13 VITALS
WEIGHT: 155.5 LBS | HEIGHT: 68 IN | BODY MASS INDEX: 23.57 KG/M2 | HEART RATE: 86 BPM | SYSTOLIC BLOOD PRESSURE: 111 MMHG | OXYGEN SATURATION: 98 % | DIASTOLIC BLOOD PRESSURE: 57 MMHG | TEMPERATURE: 98.9 F

## 2018-12-13 DIAGNOSIS — Z12.31 SCREENING MAMMOGRAM, ENCOUNTER FOR: ICD-10-CM

## 2018-12-13 DIAGNOSIS — K40.90 RIGHT INGUINAL HERNIA: Primary | ICD-10-CM

## 2018-12-13 LAB
COPATH REPORT: NORMAL
PAP: NORMAL

## 2018-12-13 ASSESSMENT — MIFFLIN-ST. JEOR: SCORE: 1395.9

## 2018-12-13 ASSESSMENT — PAIN SCALES - GENERAL: PAINLEVEL: NO PAIN (0)

## 2018-12-13 NOTE — PATIENT INSTRUCTIONS
You met with Dr. Ja Butt.      Today's visit instructions:    Return to the Surgery Clinic on an as needed basis.        If you have questions please contact Holland RN or Batsheva RN during regular clinic hours, Monday through Friday 7:30 AM - 4:00 PM, or you can contact us via [employer] at anytime.       If you have urgent needs after-hours, weekends, or holidays please call the hospital at [phone] and ask to speak with our on-call General Surgery Team.    Appointment [phone], option #1   Nurse Advice (Holland or Batsheva): [phone]   Surgery Scheduler (Shana): [phone]  Fax: [phone]

## 2018-12-13 NOTE — PROGRESS NOTES
Vira Moreno is a 43 year old female with a several month history of a right inguinal mass with the following symptoms of lump.    Finding was not work related.  Onset did not occur with lifting.  Obstructive symptoms:  no  Urinary difficulties:  no  Chronic cough: no  Constipation:  no  Current level of activity:  Medium, lives and works in [place]. Visits here yearly.    Past medical and surgical history, medications, allergies, family history, and social history were reviewed with the patient.    ROS: 10 point review of systems negative except noted in HPI  PHYSICAL EXAM  General appearance- healthy, alert, and in no distress.  Skin- Skin color and turgor normal.  No obvious rashes.  Neck- Neck is supple without obvious adenopathy.  Lungs- Respiratory effort unlabored.  Gait- Normal.  Abdomen - soft non distended, non tender without obvious masses.  Right inguinal hernia.    Impression:  Inguinal hernia, right asymptomatic and leaving country.  Recommendation:  Watchful waiting for now, can schedule elective surgical repair on future visit here.    A full discussion regarding the alternatives, risks, goals, and potential complications for this surgery was completed today.  The patient understood I would use non recalled mesh and  that the potential problems included but are not limited to:  Infection, bleeding, hematoma, seroma, recurrence, injury to nerve/muscle  and chronic pain.    The patient verbally expressed understanding, was given the opportunity for questions, and will contact us when wishes to proceed with surgery (lap)     The total time spent with this patient was 30 minutes.  Of this time, greater than 50% was spent counseling and coordinating care.

## 2018-12-13 NOTE — LETTER
12/13/2018       RE: Vira Moreno  [address] [facility] [place] [address]     Dear Colleague,    Thank you for referring your patient, Vira Moreno, to the [facility] GENERAL SURGERY at [facility]. Please see a copy of my visit note below.    Vira Moreno is a 43 year old female with a several month history of a right inguinal mass with the following symptoms of lump.    Finding was not work related.  Onset did not occur with lifting.  Obstructive symptoms:  no  Urinary difficulties:  no  Chronic cough: no  Constipation:  no  Current level of activity:  Medium, lives and works in [employer]. Visits here yearly.    Past medical and surgical history, medications, allergies, family history, and social history were reviewed with the patient.    ROS: 10 point review of systems negative except noted in HPI  PHYSICAL EXAM  General appearance- healthy, alert, and in no distress.  Skin- Skin color and turgor normal.  No obvious rashes.  Neck- Neck is supple without obvious adenopathy.  Lungs- Respiratory effort unlabored.  Gait- Normal.  Abdomen - soft non distended, non tender without obvious masses.  Right inguinal hernia.    Impression:  Inguinal hernia, right asymptomatic and leaving country.  Recommendation:  Watchful waiting for now, can schedule elective surgical repair on future visit here.    A full discussion regarding the alternatives, risks, goals, and potential complications for this surgery was completed today.  The patient understood I would use non recalled mesh and  that the potential problems included but are not limited to:  Infection, bleeding, hematoma, seroma, recurrence, injury to nerve/muscle  and chronic pain.    The patient verbally expressed understanding, was given the opportunity for questions, and will contact us when wishes to proceed with surgery (lap)     The total time spent with this patient was 30 minutes.  Of this time, greater than 50%  was spent counseling and coordinating care.        Again, thank you for allowing me to participate in the care of your patient.      Sincerely,    Ja Butt MD

## 2018-12-14 LAB
FINAL DIAGNOSIS: NORMAL
HPV HR 12 DNA CVX QL NAA+PROBE: NEGATIVE
HPV16 DNA SPEC QL NAA+PROBE: NEGATIVE
HPV18 DNA SPEC QL NAA+PROBE: NEGATIVE
SPECIMEN DESCRIPTION: NORMAL
SPECIMEN SOURCE CVX/VAG CYTO: NORMAL

## 2019-10-10 DIAGNOSIS — J20.9 ACUTE BRONCHITIS, UNSPECIFIED ORGANISM: ICD-10-CM

## 2019-10-11 NOTE — TELEPHONE ENCOUNTER
Requested Prescriptions   Pending Prescriptions Disp Refills    azithromycin (ZITHROMAX) 250 MG tablet      Last Written Prescription Date:  7/23/2018  Last Fill Quantity: 6 tabs,   # refills: 0  Last Office Visit: 7/23/2018  Future Office visit:       Routing refill request to provider for review/approval because:  Drug not on the [facility], [facility] or [facility] refill protocol or controlled substance

## 2019-10-14 ENCOUNTER — MYC MEDICAL ADVICE (OUTPATIENT)
Dept: FAMILY MEDICINE | Facility: CLINIC | Age: 44
End: 2019-10-14

## 2019-10-14 RX ORDER — AZITHROMYCIN 250 MG/1
TABLET, FILM COATED ORAL
Qty: 6 TABLET | Refills: 0 | OUTPATIENT
Start: 2019-10-14

## 2019-10-14 NOTE — TELEPHONE ENCOUNTER
? Not sure why RN would route a refill of antibiotics which was prescribed more than a year ago.   If patient is symptomatic - should be seen.

## 2019-10-14 NOTE — TELEPHONE ENCOUNTER
Medication refused with notation to pharmacy appointment is needed.    LetMeGo message sent to patient.    KEYSHA BensonN, RN

## 2019-11-05 ENCOUNTER — HEALTH MAINTENANCE LETTER (OUTPATIENT)
Age: 44
End: 2019-11-05

## 2019-12-12 ENCOUNTER — OFFICE VISIT (OUTPATIENT)
Dept: OBGYN | Facility: CLINIC | Age: 44
End: 2019-12-12
Payer: COMMERCIAL

## 2019-12-12 VITALS
SYSTOLIC BLOOD PRESSURE: 106 MMHG | BODY MASS INDEX: 24.23 KG/M2 | WEIGHT: 157 LBS | HEART RATE: 103 BPM | OXYGEN SATURATION: 99 % | DIASTOLIC BLOOD PRESSURE: 75 MMHG

## 2019-12-12 DIAGNOSIS — Z13.29 SCREENING FOR THYROID DISORDER: ICD-10-CM

## 2019-12-12 DIAGNOSIS — Z13.1 ENCOUNTER FOR SCREENING EXAMINATION FOR IMPAIRED GLUCOSE REGULATION AND DIABETES MELLITUS: ICD-10-CM

## 2019-12-12 DIAGNOSIS — Z01.419 ENCOUNTER FOR GYNECOLOGICAL EXAMINATION WITHOUT ABNORMAL FINDING: Primary | ICD-10-CM

## 2019-12-12 DIAGNOSIS — Z12.4 SCREENING FOR MALIGNANT NEOPLASM OF CERVIX: ICD-10-CM

## 2019-12-12 DIAGNOSIS — Z13.220 LIPID SCREENING: ICD-10-CM

## 2019-12-12 LAB
ANION GAP SERPL CALCULATED.3IONS-SCNC: 4 MMOL/L (ref 3–14)
BUN SERPL-MCNC: 11 MG/DL (ref 7–30)
CALCIUM SERPL-MCNC: 8.3 MG/DL (ref 8.5–10.1)
CHLORIDE SERPL-SCNC: 107 MMOL/L (ref 94–109)
CHOLEST SERPL-MCNC: 142 MG/DL
CO2 SERPL-SCNC: 25 MMOL/L (ref 20–32)
CREAT SERPL-MCNC: 0.66 MG/DL (ref 0.52–1.04)
GFR SERPL CREATININE-BSD FRML MDRD: >90 ML/MIN/{1.73_M2}
GLUCOSE SERPL-MCNC: 80 MG/DL (ref 70–99)
HDLC SERPL-MCNC: 47 MG/DL
LDLC SERPL CALC-MCNC: 85 MG/DL
NONHDLC SERPL-MCNC: 95 MG/DL
POTASSIUM SERPL-SCNC: 3.6 MMOL/L (ref 3.4–5.3)
SODIUM SERPL-SCNC: 136 MMOL/L (ref 133–144)
TRIGL SERPL-MCNC: 48 MG/DL
TSH SERPL DL<=0.005 MIU/L-ACNC: 2.08 MU/L (ref 0.4–4)

## 2019-12-12 PROCEDURE — 84443 ASSAY THYROID STIM HORMONE: CPT | Performed by: OBSTETRICS & GYNECOLOGY

## 2019-12-12 PROCEDURE — 80048 BASIC METABOLIC PNL TOTAL CA: CPT | Performed by: OBSTETRICS & GYNECOLOGY

## 2019-12-12 PROCEDURE — 80061 LIPID PANEL: CPT | Performed by: OBSTETRICS & GYNECOLOGY

## 2019-12-12 PROCEDURE — 36415 COLL VENOUS BLD VENIPUNCTURE: CPT | Performed by: OBSTETRICS & GYNECOLOGY

## 2019-12-12 PROCEDURE — G0145 SCR C/V CYTO,THINLAYER,RESCR: HCPCS | Performed by: OBSTETRICS & GYNECOLOGY

## 2019-12-12 PROCEDURE — 99396 PREV VISIT EST AGE 40-64: CPT | Performed by: OBSTETRICS & GYNECOLOGY

## 2019-12-12 PROCEDURE — 87624 HPV HI-RISK TYP POOLED RSLT: CPT | Performed by: OBSTETRICS & GYNECOLOGY

## 2019-12-12 NOTE — PROGRESS NOTES
Vira Moreno is a 44 year old  female who presents for annual exam.  She currently lives in [facility], plans indefinite stay, has built homes there.  Mammogram with tomosynthesis last year showed dense breasts, yearly advised, she may not be able to arrange that now given time constraints, if not she will plan for next fall when she returns.  Will send pap, labs as ordered.      Menses are regular q 28-30 days and normal lasting 0 days.  Menses flow: normal.  Patient's last menstrual period was 2019 (approximate).. Using none for contraception.  She is not currently considering pregnancy.  Besides routine health maintenance, she has no other health concerns today .  GYNECOLOGIC HISTORY:  Menarche:     Vira is sexually active with 1male partner(s) and is currently in monogamous relationship with .    History sexually transmitted infections:No STD history  STI testing offered?  Declined  YESIKA exposure: No  History of abnormal Pap smear: NO - age 30-65 PAP every 5 years with negative HPV co-testing recommended  Family history of breast CA: No  Family history of uterine/ovarian CA: Yes (Please explain):     Family history of colon CA: No    HEALTH MAINTENANCE:  Cholesterol: (  Cholesterol   Date Value Ref Range Status   2016 132 <200 mg/dL Final   2008 127 0 - 200 mg/dL Final     Comment:     LDL Cholesterol is the primary guide to therapy: LDL-cholesterol goal in high   risk patients is <100 mg/dL and in very high risk patients is <70 mg/dL.   The NCEP recommends further evaluation of: patients with cholesterol <200 mg/dL   if additional risk factors are present, cholesterol >240 mg/dL, triglycerides   >150 mg/dL, or HDL <40 mg/dL.    History of abnormal lipids:   Mammo:  . History of abnormal Mammo: .  Regular Self Breast Exams:   Calcium/Vitamin D intake: source:   Adequate?   TSH: (  TSH   Date Value Ref Range Status   2016 3.24 0.40 - 4.00 mU/L Final    )  Pap; (  Lab  Results   Component Value Date    PAP NIL 12/10/2018    PAP NIL 2017    PAP ASC-US 2016    )    HISTORY:  OB History    Para Term  AB Living   0 0 0 0 0 0   SAB TAB Ectopic Multiple Live Births   0 0 0 0 0     Past Medical History:   Diagnosis Date     ASCUS favor benign     neg HPV     Cervical high risk HPV (human papillomavirus) test positive 2014    NIL pap, + HPV (not 16 or 18)     Human papillomavirus in conditions classified elsewhere and of unspecified site      Trichomonal vulvovaginitis      Past Surgical History:   Procedure Laterality Date     NO HISTORY OF SURGERY       Family History   Problem Relation Age of Onset     Gastrointestinal Disease Mother         Gallbladder     Lipids Mother      Thyroid Disease Mother      Hypertension Father      Arthritis Father      Heart Disease Maternal Grandmother      C.A.D. Maternal Grandfather      Cerebrovascular Disease Paternal Grandmother      C.A.D. Paternal Grandfather      Cancer Maternal Aunt         ovarian     Cancer Paternal Aunt         ovarian/ passed away     Diabetes Maternal Aunt      Social History     Socioeconomic History     Marital status:      Spouse name: None     Number of children: None     Years of education: None     Highest education level: None   Occupational History     None   Social Needs     Financial resource strain: None     Food insecurity:     Worry: None     Inability: None     Transportation needs:     Medical: None     Non-medical: None   Tobacco Use     Smoking status: Former Smoker     Types: Cigarettes     Last attempt to quit: 3/1/2012     Years since quittin.7     Smokeless tobacco: Never Used     Tobacco comment: 1-2 cigarettes daily   Substance and Sexual Activity     Alcohol use: Yes     Alcohol/week: 0.0 standard drinks     Comment: 1 - 2 drinks per day     Drug use: No     Sexual activity: Yes     Partners: Male   Lifestyle     Physical activity:     Days per  week: None     Minutes per session: None     Stress: None   Relationships     Social connections:     Talks on phone: None     Gets together: None     Attends Worship service: None     Active member of club or organization: None     Attends meetings of clubs or organizations: None     Relationship status: None     Intimate partner violence:     Fear of current or ex partner: None     Emotionally abused: None     Physically abused: None     Forced sexual activity: None   Other Topics Concern      Service No     Blood Transfusions No     Caffeine Concern Yes     Comment: 1-2 per day     Occupational Exposure No     Hobby Hazards No     Sleep Concern No     Stress Concern No     Weight Concern No     Special Diet No     Back Care No     Exercise Yes     Bike Helmet No     Seat Belt Yes     Self-Exams No     Parent/sibling w/ CABG, MI or angioplasty before 65F 55M? No   Social History Narrative     None       Current Outpatient Medications:      acetaZOLAMIDE (DIAMOX) 125 MG tablet, For prevention of altitude sickness: Take 1 pill twice daily starting 1-2 days before ascent and stop after 2-3 days For treatment of altitude sickness: Take 2 pills twice daily for 2-3 days (Patient not taking: Reported on 7/23/2018), Disp: 20 tablet, Rfl: 0     azithromycin (ZITHROMAX) 250 MG tablet, Two tablets first day, then one tablet daily for four days. (Patient not taking: Reported on 12/13/2018), Disp: 6 tablet, Rfl: 0     ciprofloxacin (CIPRO) 500 MG tablet, Take 1 tablet (500 mg) by mouth 2 times daily (Patient not taking: Reported on 7/23/2018), Disp: 6 tablet, Rfl: 0     ethynodiol-ethinyl estradiol (ZOVIA 1/35E, 28,) 1-35 MG-MCG per tablet, Take 1 tablet by mouth daily Due for annual exam 4/2018, must be seen in clinic for any further refills. (Patient not taking: Reported on 7/23/2018), Disp: 84 tablet, Rfl: 0     Allergies   Allergen Reactions     No Known Drug Allergies        Past medical, surgical, social and  family history were reviewed and updated in EPIC.    ROS:   C:     NEGATIVE for fever, chills, change in weight  I:       NEGATIVE for worrisome rashes, moles or lesions  E:     NEGATIVE for vision changes or irritation  E/M: NEGATIVE for ear, mouth and throat problems  R:     NEGATIVE for significant cough or SOB  CV:   NEGATIVE for chest pain, palpitations or peripheral edema  GI:     NEGATIVE for nausea, abdominal pain, heartburn, or change in bowel habits  :   NEGATIVE for frequency, dysuria, hematuria, vaginal discharge, or irregular bleeding  M:     NEGATIVE for significant arthralgias or myalgia  N:      NEGATIVE for weakness, dizziness or paresthesias  E:      NEGATIVE for temperature intolerance, skin/hair changes  P:      NEGATIVE for changes in mood or affect.    EXAM:  /75   Pulse 103   Wt 71.2 kg (157 lb)   LMP 11/12/2019 (Approximate)   SpO2 99%   Breastfeeding No   BMI 24.23 kg/m     BMI: Body mass index is 24.23 kg/m .  Constitutional: healthy, alert and no distress  Head: Normocephalic. No masses, lesions, tenderness or abnormalities  Neck: Neck supple. Trachea midline. No adenopathy. Thyroid symmetric, normal size.   Cardiovascular: RRR.   Respiratory: Negative.   Breast: No nodularity, asymmetry or nipple discharge bilaterally.  Gastrointestinal: Abdomen soft, non-tender, non-distended. No masses, organomegaly.  :  Vulva:  No external lesions, normal female hair distribution, no inguinal adenopathy.    Urethra:  Midline, non-tender, well supported, no discharge  Vagina:  Moist, pink, no abnormal discharge, no lesions  Uterus:  Normal size, non-tender, freely mobile  Ovaries:  No masses appreciated, non-tender, mobile  Rectal Exam: deferred  Musculoskeletal: extremities normal  Skin: no suspicious lesions or rashes  Psychiatric: Affect appropriate, cooperative,mentation appears normal.     COUNSELING:      reports that she quit smoking about 7 years ago. Her smoking use included  cigarettes. She has never used smokeless tobacco.    Body mass index is 24.23 kg/m .    FRAX Risk Assessment    ASSESSMENT:  44 year old female with satisfactory annual exam

## 2019-12-16 LAB
COPATH REPORT: NORMAL
PAP: NORMAL

## 2020-11-22 ENCOUNTER — HEALTH MAINTENANCE LETTER (OUTPATIENT)
Age: 45
End: 2020-11-22

## 2021-01-15 ENCOUNTER — HEALTH MAINTENANCE LETTER (OUTPATIENT)
Age: 46
End: 2021-01-15

## 2021-02-13 ENCOUNTER — HEALTH MAINTENANCE LETTER (OUTPATIENT)
Age: 46
End: 2021-02-13

## 2021-09-19 ENCOUNTER — HEALTH MAINTENANCE LETTER (OUTPATIENT)
Age: 46
End: 2021-09-19

## 2022-03-05 ENCOUNTER — HEALTH MAINTENANCE LETTER (OUTPATIENT)
Age: 47
End: 2022-03-05

## 2022-11-20 ENCOUNTER — HEALTH MAINTENANCE LETTER (OUTPATIENT)
Age: 47
End: 2022-11-20

## 2023-04-15 ENCOUNTER — HEALTH MAINTENANCE LETTER (OUTPATIENT)
Age: 48
End: 2023-04-15